# Patient Record
Sex: MALE | Race: WHITE
[De-identification: names, ages, dates, MRNs, and addresses within clinical notes are randomized per-mention and may not be internally consistent; named-entity substitution may affect disease eponyms.]

---

## 2017-12-31 ENCOUNTER — HOSPITAL ENCOUNTER (INPATIENT)
Dept: HOSPITAL 83 - ED | Age: 62
LOS: 1 days | Discharge: HOME | DRG: 193 | End: 2018-01-01
Attending: INTERNAL MEDICINE | Admitting: INTERNAL MEDICINE
Payer: COMMERCIAL

## 2017-12-31 VITALS — BODY MASS INDEX: 29.44 KG/M2 | HEIGHT: 72 IN | WEIGHT: 217.38 LBS

## 2017-12-31 VITALS — DIASTOLIC BLOOD PRESSURE: 80 MMHG | SYSTOLIC BLOOD PRESSURE: 174 MMHG

## 2017-12-31 VITALS — DIASTOLIC BLOOD PRESSURE: 52 MMHG

## 2017-12-31 VITALS — DIASTOLIC BLOOD PRESSURE: 68 MMHG | SYSTOLIC BLOOD PRESSURE: 146 MMHG

## 2017-12-31 VITALS — DIASTOLIC BLOOD PRESSURE: 68 MMHG

## 2017-12-31 DIAGNOSIS — Z82.49: ICD-10-CM

## 2017-12-31 DIAGNOSIS — E87.1: ICD-10-CM

## 2017-12-31 DIAGNOSIS — Z71.6: ICD-10-CM

## 2017-12-31 DIAGNOSIS — I10: ICD-10-CM

## 2017-12-31 DIAGNOSIS — J18.9: Primary | ICD-10-CM

## 2017-12-31 DIAGNOSIS — F17.210: ICD-10-CM

## 2017-12-31 DIAGNOSIS — Z80.8: ICD-10-CM

## 2017-12-31 DIAGNOSIS — J96.01: ICD-10-CM

## 2017-12-31 DIAGNOSIS — E66.9: ICD-10-CM

## 2017-12-31 LAB
ALBUMIN SERPL-MCNC: 3.9 GM/DL (ref 3.1–4.5)
ALP SERPL-CCNC: 154 U/L (ref 45–117)
ALT SERPL W P-5'-P-CCNC: 27 U/L (ref 12–78)
APTT PPP: 31 SECONDS (ref 20.8–31.5)
AST SERPL-CCNC: 34 IU/L (ref 3–35)
BASOPHILS # BLD AUTO: 0.1 10*3/UL (ref 0–0.1)
BASOPHILS NFR BLD AUTO: 0.9 % (ref 0–1)
BUN SERPL-MCNC: 13 MG/DL (ref 7–24)
CHLORIDE SERPL-SCNC: 94 MMOL/L (ref 98–107)
CREAT SERPL-MCNC: 0.81 MG/DL (ref 0.7–1.3)
EOSINOPHIL # BLD AUTO: 0.1 10*3/UL (ref 0–0.4)
EOSINOPHIL # BLD AUTO: 1.3 % (ref 1–4)
ERYTHROCYTE [DISTWIDTH] IN BLOOD BY AUTOMATED COUNT: 13.4 % (ref 0–14.5)
HCT VFR BLD AUTO: 46 % (ref 42–52)
HGB BLD-MCNC: 15.1 G/DL (ref 14–18)
INR BLD: 1.1 (ref 2–3.5)
LIPASE SERPL-CCNC: 107 U/L (ref 73–393)
LYMPHOCYTES # BLD AUTO: 1.7 10*3/UL (ref 1.3–4.4)
LYMPHOCYTES NFR BLD AUTO: 25.6 % (ref 27–41)
MCH RBC QN AUTO: 29.1 PG (ref 27–31)
MCHC RBC AUTO-ENTMCNC: 32.8 G/DL (ref 33–37)
MCV RBC AUTO: 88.6 FL (ref 80–94)
MONOCYTES # BLD AUTO: 0.8 10*3/UL (ref 0.1–1)
MONOCYTES NFR BLD MANUAL: 11.2 % (ref 3–9)
NEUT #: 4 10*3/UL (ref 2.3–7.9)
NEUT %: 60.7 % (ref 47–73)
NRBC BLD QL AUTO: 0 10*3/UL (ref 0–0)
PLATELET # BLD AUTO: 270 10*3/UL (ref 130–400)
PMV BLD AUTO: 10.1 FL (ref 9.6–12.3)
POTASSIUM SERPL-SCNC: 4.1 MMOL/L (ref 3.5–5.1)
PROT SERPL-MCNC: 8.1 GM/DL (ref 6.4–8.2)
RBC # BLD AUTO: 5.19 10*6/UL (ref 4.5–5.9)
SODIUM SERPL-SCNC: 135 MMOL/L (ref 136–145)
TROPONIN I SERPL-MCNC: < 0.015 NG/ML (ref ?–0.04)
WBC NRBC COR # BLD AUTO: 6.7 10*3/UL (ref 4.8–10.8)

## 2018-01-01 VITALS — SYSTOLIC BLOOD PRESSURE: 157 MMHG | DIASTOLIC BLOOD PRESSURE: 62 MMHG

## 2018-01-01 VITALS — SYSTOLIC BLOOD PRESSURE: 150 MMHG | DIASTOLIC BLOOD PRESSURE: 72 MMHG

## 2018-01-01 LAB
25(OH)D3 SERPL-MCNC: 8.4 NG/ML (ref 30–100)
ALBUMIN SERPL-MCNC: 3.7 GM/DL (ref 3.1–4.5)
ALP SERPL-CCNC: 148 U/L (ref 45–117)
ALT SERPL W P-5'-P-CCNC: 28 U/L (ref 12–78)
AST SERPL-CCNC: 23 IU/L (ref 3–35)
BASOPHILS # BLD AUTO: 0 10*3/UL (ref 0–0.1)
BASOPHILS NFR BLD AUTO: 0.1 % (ref 0–1)
BUN SERPL-MCNC: 13 MG/DL (ref 7–24)
CHLORIDE SERPL-SCNC: 95 MMOL/L (ref 98–107)
CHOLEST SERPL-MCNC: 165 MG/DL (ref ?–200)
CREAT SERPL-MCNC: 0.78 MG/DL (ref 0.7–1.3)
EOSINOPHIL # BLD AUTO: 0 % (ref 1–4)
EOSINOPHIL # BLD AUTO: 0 10*3/UL (ref 0–0.4)
ERYTHROCYTE [DISTWIDTH] IN BLOOD BY AUTOMATED COUNT: 13.3 % (ref 0–14.5)
HCT VFR BLD AUTO: 45.9 % (ref 42–52)
HDLC SERPL-MCNC: 62 MG/DL (ref 40–60)
HGB BLD-MCNC: 15.3 G/DL (ref 14–18)
INR BLD: 1.1 (ref 2–3.5)
LDLC SERPL DIRECT ASSAY-MCNC: 92 MG/DL (ref 9–159)
LYMPHOCYTES # BLD AUTO: 1 10*3/UL (ref 1.3–4.4)
LYMPHOCYTES NFR BLD AUTO: 13.2 % (ref 27–41)
MCH RBC QN AUTO: 29.5 PG (ref 27–31)
MCHC RBC AUTO-ENTMCNC: 33.3 G/DL (ref 33–37)
MCV RBC AUTO: 88.6 FL (ref 80–94)
MONOCYTES # BLD AUTO: 0.2 10*3/UL (ref 0.1–1)
MONOCYTES NFR BLD MANUAL: 2.4 % (ref 3–9)
NEUT #: 6 10*3/UL (ref 2.3–7.9)
NEUT %: 84 % (ref 47–73)
NRBC BLD QL AUTO: 0 10*3/UL (ref 0–0)
PHOSPHATE SERPL-MCNC: 4 MG/DL (ref 2.5–4.9)
PLATELET # BLD AUTO: 285 10*3/UL (ref 130–400)
PMV BLD AUTO: 9.6 FL (ref 9.6–12.3)
POTASSIUM SERPL-SCNC: 4 MMOL/L (ref 3.5–5.1)
PROT SERPL-MCNC: 7.9 GM/DL (ref 6.4–8.2)
RBC # BLD AUTO: 5.18 10*6/UL (ref 4.5–5.9)
SODIUM SERPL-SCNC: 134 MMOL/L (ref 136–145)
TRIGL SERPL-MCNC: 54 MG/DL (ref ?–150)
TSH SERPL DL<=0.005 MIU/L-ACNC: 0.49 UIU/ML (ref 0.36–4.75)
VITAMIN B12: 602 PG/ML (ref 247–911)
VLDLC SERPL CALC-MCNC: 11 MG/DL (ref 6–40)
WBC NRBC COR # BLD AUTO: 7.2 10*3/UL (ref 4.8–10.8)

## 2019-05-20 ENCOUNTER — CONSULT (OUTPATIENT)
Dept: URBAN - METROPOLITAN AREA CLINIC 25 | Facility: CLINIC | Age: 64
Setting detail: DERMATOLOGY
End: 2019-05-20

## 2019-05-20 DIAGNOSIS — C44.91 BASAL CELL CARCINOMA OF SKIN, UNSPECIFIED: ICD-10-CM

## 2019-05-20 DIAGNOSIS — L30.9 DERMATITIS, UNSPECIFIED: ICD-10-CM

## 2019-05-20 PROBLEM — C44.722 SQUAMOUS CELL CARCINOMA OF SKIN OF RIGHT LOWER LIMB, INCLUDING HIP: Status: ACTIVE | Noted: 2019-05-20

## 2019-05-20 PROBLEM — C44.722 SQUAMOUS CELL CARCINOMA OF SKIN OF RIGHT LOWER LIMB, INCLUDING HIP: Status: RESOLVED | Noted: 2019-05-20

## 2019-05-20 PROCEDURE — 99202 OFFICE O/P NEW SF 15 MIN: CPT

## 2019-06-24 LAB
LV EF: 58 %
LVEF MODALITY: NORMAL

## 2019-11-21 ENCOUNTER — HOSPITAL ENCOUNTER (OUTPATIENT)
Dept: HOSPITAL 83 - RESCLI | Age: 64
Discharge: HOME | End: 2019-11-21
Attending: STUDENT IN AN ORGANIZED HEALTH CARE EDUCATION/TRAINING PROGRAM
Payer: COMMERCIAL

## 2019-11-21 DIAGNOSIS — E66.9: ICD-10-CM

## 2019-11-21 DIAGNOSIS — I11.0: Primary | ICD-10-CM

## 2019-11-21 DIAGNOSIS — I50.32: ICD-10-CM

## 2019-11-21 DIAGNOSIS — C34.12: ICD-10-CM

## 2019-11-21 DIAGNOSIS — I48.21: ICD-10-CM

## 2019-11-21 DIAGNOSIS — Z79.899: ICD-10-CM

## 2020-01-23 ENCOUNTER — NEW SPOT (OUTPATIENT)
Dept: URBAN - METROPOLITAN AREA CLINIC 25 | Facility: CLINIC | Age: 65
Setting detail: DERMATOLOGY
End: 2020-01-23

## 2020-01-23 PROCEDURE — 88331 PATH CONSLTJ SURG 1 BLK 1SPC: CPT

## 2020-01-23 PROCEDURE — 99214 OFFICE O/P EST MOD 30 MIN: CPT

## 2020-01-23 PROCEDURE — 11313 SHAVE SKIN LESION >2.0 CM: CPT

## 2020-03-05 ENCOUNTER — HOSPITAL ENCOUNTER (EMERGENCY)
Dept: HOSPITAL 83 - ED | Age: 65
Discharge: HOME | End: 2020-03-05
Payer: COMMERCIAL

## 2020-03-05 VITALS — WEIGHT: 250 LBS | BODY MASS INDEX: 37.89 KG/M2 | HEIGHT: 67.99 IN

## 2020-03-05 DIAGNOSIS — E11.9: ICD-10-CM

## 2020-03-05 DIAGNOSIS — E66.9: ICD-10-CM

## 2020-03-05 DIAGNOSIS — R06.00: Primary | ICD-10-CM

## 2020-03-05 DIAGNOSIS — N39.0: ICD-10-CM

## 2020-03-05 DIAGNOSIS — Z87.891: ICD-10-CM

## 2020-03-05 DIAGNOSIS — I10: ICD-10-CM

## 2020-03-05 LAB
ALBUMIN SERPL-MCNC: 3.1 GM/DL (ref 3.1–4.5)
ALP SERPL-CCNC: 108 U/L (ref 45–117)
ALT SERPL W P-5'-P-CCNC: 34 U/L (ref 12–78)
APPEARANCE UR: (no result)
APTT PPP: 23 SECONDS (ref 20–32.1)
AST SERPL-CCNC: 9 IU/L (ref 3–35)
BACTERIA #/AREA URNS HPF: (no result) /[HPF]
BASOPHILS # BLD AUTO: 0 10*3/UL (ref 0–0.1)
BASOPHILS NFR BLD AUTO: 0.1 % (ref 0–1)
BILIRUB UR QL STRIP: NEGATIVE
BUN SERPL-MCNC: 37 MG/DL (ref 7–24)
CHLORIDE SERPL-SCNC: 94 MMOL/L (ref 98–107)
COLOR UR: YELLOW
CREAT SERPL-MCNC: 1.81 MG/DL (ref 0.7–1.3)
EOSINOPHIL # BLD AUTO: 0 % (ref 1–4)
EOSINOPHIL # BLD AUTO: 0 10*3/UL (ref 0–0.4)
EPI CELLS #/AREA URNS HPF: (no result) /[HPF]
ERYTHROCYTE [DISTWIDTH] IN BLOOD BY AUTOMATED COUNT: 14.6 % (ref 0–14.5)
GLUCOSE UR QL: (no result)
HCT VFR BLD AUTO: 35.6 % (ref 42–52)
HGB BLD-MCNC: 11.7 G/DL (ref 14–18)
HGB UR QL STRIP: (no result)
INR BLD: 0.9 (ref 2–3.5)
KETONES UR QL STRIP: NEGATIVE
LEUKOCYTE ESTERASE UR QL STRIP: NEGATIVE
LYMPHOCYTES # BLD AUTO: 0.6 10*3/UL (ref 1.3–4.4)
LYMPHOCYTES NFR BLD AUTO: 3.3 % (ref 27–41)
MCH RBC QN AUTO: 30.7 PG (ref 27–31)
MCHC RBC AUTO-ENTMCNC: 32.9 G/DL (ref 33–37)
MCV RBC AUTO: 93.4 FL (ref 80–94)
MONOCYTES # BLD AUTO: 1 10*3/UL (ref 0.1–1)
MONOCYTES NFR BLD MANUAL: 5.9 % (ref 3–9)
MUCOUS THREADS URNS QL MICRO: (no result)
NEUT #: 15.1 10*3/UL (ref 2.3–7.9)
NEUT %: 89.9 % (ref 47–73)
NITRITE UR QL STRIP: NEGATIVE
NRBC BLD QL AUTO: 0 10*3/UL (ref 0–0)
PH UR STRIP: 6.5 [PH] (ref 5–9)
PLATELET # BLD AUTO: 217 10*3/UL (ref 130–400)
PMV BLD AUTO: 11.1 FL (ref 9.6–12.3)
POTASSIUM SERPL-SCNC: 3.4 MMOL/L (ref 3.5–5.1)
PROT SERPL-MCNC: 6.5 GM/DL (ref 6.4–8.2)
RBC # BLD AUTO: 3.81 10*6/UL (ref 4.5–5.9)
RBC #/AREA URNS HPF: (no result) RBC/HPF (ref 0–2)
SODIUM SERPL-SCNC: 132 MMOL/L (ref 136–145)
SP GR UR: 1.01 (ref 1–1.03)
TROPONIN I SERPL-MCNC: < 0.015 NG/ML (ref ?–0.04)
UROBILINOGEN UR STRIP-MCNC: 0.2 E.U./DL (ref 0.2–1)
WBC #/AREA URNS HPF: (no result) WBC/HPF (ref 0–5)
WBC NRBC COR # BLD AUTO: 16.8 10*3/UL (ref 4.8–10.8)

## 2020-03-06 ENCOUNTER — HOSPITAL ENCOUNTER (OUTPATIENT)
Dept: HOSPITAL 83 - RESCLI | Age: 65
Discharge: HOME | End: 2020-03-06
Attending: INTERNAL MEDICINE
Payer: COMMERCIAL

## 2020-03-06 DIAGNOSIS — E11.22: ICD-10-CM

## 2020-03-06 DIAGNOSIS — K21.9: ICD-10-CM

## 2020-03-06 DIAGNOSIS — E66.9: ICD-10-CM

## 2020-03-06 DIAGNOSIS — Z79.4: ICD-10-CM

## 2020-03-06 DIAGNOSIS — N18.2: ICD-10-CM

## 2020-03-06 DIAGNOSIS — I50.32: ICD-10-CM

## 2020-03-06 DIAGNOSIS — Z79.82: ICD-10-CM

## 2020-03-06 DIAGNOSIS — I13.0: Primary | ICD-10-CM

## 2020-03-06 DIAGNOSIS — I48.21: ICD-10-CM

## 2020-03-06 DIAGNOSIS — G62.9: ICD-10-CM

## 2020-03-06 DIAGNOSIS — Z85.118: ICD-10-CM

## 2020-03-06 DIAGNOSIS — C34.12: ICD-10-CM

## 2020-06-17 ENCOUNTER — HOSPITAL ENCOUNTER (OUTPATIENT)
Dept: HOSPITAL 83 - RESCLI | Age: 65
Discharge: HOME | End: 2020-06-17
Attending: STUDENT IN AN ORGANIZED HEALTH CARE EDUCATION/TRAINING PROGRAM
Payer: COMMERCIAL

## 2020-06-17 DIAGNOSIS — I48.21: ICD-10-CM

## 2020-06-17 DIAGNOSIS — N18.2: ICD-10-CM

## 2020-06-17 DIAGNOSIS — Z79.899: ICD-10-CM

## 2020-06-17 DIAGNOSIS — Z87.891: ICD-10-CM

## 2020-06-17 DIAGNOSIS — C34.12: Primary | ICD-10-CM

## 2020-06-17 DIAGNOSIS — I13.0: ICD-10-CM

## 2020-06-17 DIAGNOSIS — K21.9: ICD-10-CM

## 2020-06-17 DIAGNOSIS — E66.9: ICD-10-CM

## 2020-06-17 DIAGNOSIS — E11.22: ICD-10-CM

## 2020-06-17 DIAGNOSIS — G62.9: ICD-10-CM

## 2020-06-17 DIAGNOSIS — I50.32: ICD-10-CM

## 2020-08-26 ENCOUNTER — HOSPITAL ENCOUNTER (OUTPATIENT)
Dept: HOSPITAL 83 - RESCLI | Age: 65
Discharge: HOME | End: 2020-08-26
Attending: INTERNAL MEDICINE
Payer: COMMERCIAL

## 2020-08-26 DIAGNOSIS — I50.32: ICD-10-CM

## 2020-08-26 DIAGNOSIS — G62.9: ICD-10-CM

## 2020-08-26 DIAGNOSIS — C34.12: Primary | ICD-10-CM

## 2020-08-26 DIAGNOSIS — N18.2: ICD-10-CM

## 2020-08-26 DIAGNOSIS — K59.09: ICD-10-CM

## 2020-08-26 DIAGNOSIS — I13.0: ICD-10-CM

## 2020-08-26 DIAGNOSIS — Z79.899: ICD-10-CM

## 2020-08-26 DIAGNOSIS — I48.21: ICD-10-CM

## 2020-08-26 DIAGNOSIS — J44.9: ICD-10-CM

## 2020-08-26 DIAGNOSIS — E11.9: ICD-10-CM

## 2020-08-26 DIAGNOSIS — E66.9: ICD-10-CM

## 2020-08-26 DIAGNOSIS — K21.9: ICD-10-CM

## 2020-12-17 ENCOUNTER — HOSPITAL ENCOUNTER (OUTPATIENT)
Dept: HOSPITAL 83 - RESCLI | Age: 65
Discharge: HOME | End: 2020-12-17
Attending: INTERNAL MEDICINE
Payer: COMMERCIAL

## 2020-12-17 DIAGNOSIS — Z79.84: ICD-10-CM

## 2020-12-17 DIAGNOSIS — Z79.899: ICD-10-CM

## 2020-12-17 DIAGNOSIS — C34.12: ICD-10-CM

## 2020-12-17 DIAGNOSIS — I48.21: Primary | ICD-10-CM

## 2020-12-17 DIAGNOSIS — E11.9: ICD-10-CM

## 2020-12-17 DIAGNOSIS — G62.9: ICD-10-CM

## 2020-12-17 DIAGNOSIS — K21.9: ICD-10-CM

## 2020-12-17 DIAGNOSIS — N18.2: ICD-10-CM

## 2020-12-17 DIAGNOSIS — I50.32: ICD-10-CM

## 2020-12-17 DIAGNOSIS — I13.0: ICD-10-CM

## 2021-03-04 ENCOUNTER — NEW SPOT (OUTPATIENT)
Dept: URBAN - METROPOLITAN AREA CLINIC 25 | Facility: CLINIC | Age: 66
Setting detail: DERMATOLOGY
End: 2021-03-04

## 2021-03-04 ENCOUNTER — RX ONLY (RX ONLY)
Age: 66
End: 2021-03-04

## 2021-03-04 DIAGNOSIS — D18.01 HEMANGIOMA OF SKIN AND SUBCUTANEOUS TISSUE: ICD-10-CM

## 2021-03-04 DIAGNOSIS — L57.8 OTHER SKIN CHANGES DUE TO CHRONIC EXPOSURE TO NONIONIZING RADIATION: ICD-10-CM

## 2021-03-04 DIAGNOSIS — L57.0 ACTINIC KERATOSIS: ICD-10-CM

## 2021-03-04 DIAGNOSIS — L82.1 OTHER SEBORRHEIC KERATOSIS: ICD-10-CM

## 2021-03-04 DIAGNOSIS — L57.3 POIKILODERMA OF CIVATTE: ICD-10-CM

## 2021-03-04 PROCEDURE — 99213 OFFICE O/P EST LOW 20 MIN: CPT

## 2021-03-04 RX ORDER — HYDROCORTISONE 25 MG/G
AS DIRECTED CREAM TOPICAL TWICE A DAY
Qty: 30 | Refills: 0
Start: 2021-03-04

## 2021-03-04 RX ORDER — KETOCONAZOLE 20 MG/ML
AS DIRECTED SHAMPOO, SUSPENSION TOPICAL ONCE A DAY
Qty: 120 | Refills: 1
Start: 2021-03-04

## 2021-03-04 RX ORDER — FLUOCINONIDE 0.5 MG/ML
AS DIRECTED SOLUTION TOPICAL TWICE A DAY
Qty: 60 | Refills: 1
Start: 2021-03-04

## 2021-03-04 RX ORDER — MUPIROCIN 20 MG/G
AS DIRECTED OINTMENT TOPICAL THREE TIMES A DAY
Qty: 22 | Refills: 0
Start: 2021-03-04

## 2021-04-13 ENCOUNTER — APPOINTMENT (RX ONLY)
Dept: URBAN - METROPOLITAN AREA CLINIC 12 | Facility: CLINIC | Age: 66
Setting detail: DERMATOLOGY
End: 2021-04-13

## 2021-04-13 DIAGNOSIS — L40.0 PSORIASIS VULGARIS: ICD-10-CM | Status: INADEQUATELY CONTROLLED

## 2021-04-13 PROCEDURE — ? PRESCRIPTION

## 2021-04-13 PROCEDURE — ? PRESCRIPTION MEDICATION MANAGEMENT

## 2021-04-13 PROCEDURE — ? ORDER TESTS

## 2021-04-13 PROCEDURE — 99204 OFFICE O/P NEW MOD 45 MIN: CPT

## 2021-04-13 PROCEDURE — ? COUNSELING

## 2021-04-13 PROCEDURE — ? DIAGNOSIS COMMENT

## 2021-04-13 RX ORDER — HALOBETASOL PROPIONATE AND TAZAROTENE .1; .45 MG/G; MG/G
LOTION TOPICAL
Qty: 1 | Refills: 3 | Status: ERX | COMMUNITY
Start: 2021-04-13

## 2021-04-13 RX ORDER — CALCIPOTRIENE 0.05 MG/G
CREAM TOPICAL
Qty: 1 | Refills: 2 | Status: ERX | COMMUNITY
Start: 2021-04-13

## 2021-04-13 RX ADMIN — CALCIPOTRIENE: 0.05 CREAM TOPICAL at 00:00

## 2021-04-13 RX ADMIN — HALOBETASOL PROPIONATE AND TAZAROTENE: .1; .45 LOTION TOPICAL at 00:00

## 2021-04-13 NOTE — HPI: RASH
What Type Of Note Output Would You Prefer (Optional)?: Bullet Format
Is The Patient Presenting As Previously Scheduled?: Yes
How Severe Is Your Rash?: mild
Is This A New Presentation, Or A Follow-Up?: Rash
Additional History: Pt [facility] and [facility] prescribed fluconazole and it helped but didnt make it go away.

## 2021-04-13 NOTE — PROCEDURE: PRESCRIPTION MEDICATION MANAGEMENT
Render In Strict Bullet Format?: No
Detail Level: Zone
Initiate Treatment: Benadryl every 6hours as needed for itching, including before bed \\n\\nAllegra 180mg twice a day
Initiate Treatment: NBUV if covered by insurance

## 2021-11-10 ENCOUNTER — HOSPITAL ENCOUNTER (OUTPATIENT)
Dept: HOSPITAL 83 - RESCLI | Age: 66
Discharge: HOME | End: 2021-11-10
Attending: STUDENT IN AN ORGANIZED HEALTH CARE EDUCATION/TRAINING PROGRAM
Payer: COMMERCIAL

## 2021-11-10 DIAGNOSIS — Z79.84: ICD-10-CM

## 2021-11-10 DIAGNOSIS — E11.9: ICD-10-CM

## 2021-11-10 DIAGNOSIS — R06.00: Primary | ICD-10-CM

## 2021-11-10 DIAGNOSIS — K59.00: ICD-10-CM

## 2021-11-10 DIAGNOSIS — J44.9: ICD-10-CM

## 2021-11-10 DIAGNOSIS — G62.9: ICD-10-CM

## 2021-11-10 DIAGNOSIS — I50.32: ICD-10-CM

## 2021-11-10 DIAGNOSIS — I48.91: ICD-10-CM

## 2021-11-10 DIAGNOSIS — Z79.899: ICD-10-CM

## 2021-11-10 DIAGNOSIS — K59.09: ICD-10-CM

## 2021-11-10 DIAGNOSIS — K21.9: ICD-10-CM

## 2022-01-04 ENCOUNTER — HOSPITAL ENCOUNTER (OUTPATIENT)
Dept: HOSPITAL 83 - RESCLI | Age: 67
Discharge: HOME | End: 2022-01-04
Attending: INTERNAL MEDICINE
Payer: COMMERCIAL

## 2022-01-04 DIAGNOSIS — Z79.4: ICD-10-CM

## 2022-01-04 DIAGNOSIS — G62.9: ICD-10-CM

## 2022-01-04 DIAGNOSIS — Z23: Primary | ICD-10-CM

## 2022-01-04 DIAGNOSIS — K59.09: ICD-10-CM

## 2022-01-04 DIAGNOSIS — I50.32: ICD-10-CM

## 2022-01-04 DIAGNOSIS — E11.9: ICD-10-CM

## 2022-01-04 DIAGNOSIS — R00.0: ICD-10-CM

## 2022-01-04 DIAGNOSIS — Z79.899: ICD-10-CM

## 2022-01-04 DIAGNOSIS — I48.21: ICD-10-CM

## 2022-01-04 DIAGNOSIS — C34.12: ICD-10-CM

## 2022-01-04 DIAGNOSIS — K21.9: ICD-10-CM
